# Patient Record
Sex: FEMALE | Race: BLACK OR AFRICAN AMERICAN | NOT HISPANIC OR LATINO | Employment: OTHER | URBAN - NONMETROPOLITAN AREA
[De-identification: names, ages, dates, MRNs, and addresses within clinical notes are randomized per-mention and may not be internally consistent; named-entity substitution may affect disease eponyms.]

---

## 2017-03-10 ENCOUNTER — OFFICE VISIT (OUTPATIENT)
Dept: PODIATRY | Facility: CLINIC | Age: 72
End: 2017-03-10

## 2017-03-10 VITALS
SYSTOLIC BLOOD PRESSURE: 118 MMHG | OXYGEN SATURATION: 98 % | HEIGHT: 59 IN | BODY MASS INDEX: 27.82 KG/M2 | DIASTOLIC BLOOD PRESSURE: 76 MMHG | WEIGHT: 138 LBS | HEART RATE: 83 BPM

## 2017-03-10 DIAGNOSIS — B35.1 ONYCHOMYCOSIS: ICD-10-CM

## 2017-03-10 DIAGNOSIS — M79.671 RIGHT FOOT PAIN: Primary | ICD-10-CM

## 2017-03-10 PROCEDURE — 99203 OFFICE O/P NEW LOW 30 MIN: CPT | Performed by: PODIATRIST

## 2017-03-10 NOTE — PROGRESS NOTES
"Liz Garcia  1945  71 y.o. female   Patient presents today for right 3rd toenail problem.    3/10/2017  Chief Complaint   Patient presents with   • Right Foot - Nail Problem           History of Present Illness    Patient presents to clinic today with chief complaint of right third toenail pain.  She states the nail is thick and long and she is unable to cut it.  This is been a problem for several months.  She has tried nothing to treat the pain.  She describes it as dull and achy.  She denies any trauma.  She has no other pedal complaints         No past medical history on file.      No past surgical history on file.      No family history on file.      Social History     Social History   • Marital status:      Spouse name: N/A   • Number of children: N/A   • Years of education: N/A     Occupational History   • Not on file.     Social History Main Topics   • Smoking status: Current Every Day Smoker   • Smokeless tobacco: Not on file   • Alcohol use No   • Drug use: Not on file   • Sexual activity: Not on file     Other Topics Concern   • Not on file     Social History Narrative   • No narrative on file         No current outpatient prescriptions on file.     No current facility-administered medications for this visit.          OBJECTIVE    Visit Vitals   • /76   • Pulse 83   • Ht 59\" (149.9 cm)   • Wt 138 lb (62.6 kg)   • SpO2 98%   • BMI 27.87 kg/m2         Review of Systems   Constitutional: Negative for chills and fever.   Cardiovascular: Negative for chest pain.   Gastrointestinal: Negative for constipation, diarrhea, nausea and vomiting.   Skin: Negative for wound. painful right 3rd toenail  Musculoskeletal: foot pain      Constitutional: well developed, well nourished    HEENT: Normocephalic and atraumatic, normal hearing    Respiratory: Non labored respirations noted    Cardiovascular:    DP/PT pulses palpable    CFT brisk  to all digits  Skin temp is warm to warm from proximal tibia to " distal digits  Pedal hair growth present.   No erythema or edema noted     Musculoskeletal:  Muscle strength is 5/5 for all muscle groups tested   ROM of the 1st MTP is full without pain or crepitus  ROM of the MTJ is full without pain or crepitus    ROM of the STJ is full without pain or crepitus    ROM of the ankle joint is full without pain or crepitus    Pain on palpation to the right third toenail  Rectus foot type     Dermatological:   Nails 1, 2, 4-5 are within normal limits for length and thickness right third digit nail is thickened and elongated and dystrophic with subungual debris.   Skin is warm, dry and intact    Webspaces 1-4 bilateral are clean, dry and intact.   No subcutaneous nodules or masses noted    No open wounds noted     Neurological:   Protective sensation intact    Sensation intact to light touch    DTR intact    Psychiatric: A&O x 3 with normal mood and affect. NAD.           Procedures        ASSESSMENT AND PLAN    Liz was seen today for nail problem.    Diagnoses and all orders for this visit:    Right foot pain    Onychomycosis      -A diabetic foot screening exam was performed and the patient was educated on the foot complications related to diabetes,  preventative foot care and what signs and symptoms to watch for.  Instructed to contact our office if any foot problems develop before next visit.  - right 3rd digit nail trimmed with a nail nipper without incident.  Educated patient on how to properly care for her toenails.  - All questions were answered and the patient is in agreement with the current treatment plan.  - RTC in 6 months          This document has been electronically signed by Minh Ennis DPM on March 10, 2017 6:42 PM     3/10/2017  6:42 PM